# Patient Record
Sex: MALE | Race: WHITE | Employment: OTHER | URBAN - METROPOLITAN AREA
[De-identification: names, ages, dates, MRNs, and addresses within clinical notes are randomized per-mention and may not be internally consistent; named-entity substitution may affect disease eponyms.]

---

## 2021-04-25 ENCOUNTER — APPOINTMENT (OUTPATIENT)
Dept: GENERAL RADIOLOGY | Age: 76
End: 2021-04-25
Attending: EMERGENCY MEDICINE
Payer: OTHER GOVERNMENT

## 2021-04-25 ENCOUNTER — APPOINTMENT (OUTPATIENT)
Dept: CT IMAGING | Age: 76
End: 2021-04-25
Attending: EMERGENCY MEDICINE
Payer: OTHER GOVERNMENT

## 2021-04-25 ENCOUNTER — HOSPITAL ENCOUNTER (EMERGENCY)
Age: 76
Discharge: HOME | End: 2021-04-25
Attending: EMERGENCY MEDICINE
Payer: OTHER GOVERNMENT

## 2021-04-25 VITALS
RESPIRATION RATE: 18 BRPM | HEIGHT: 75 IN | SYSTOLIC BLOOD PRESSURE: 155 MMHG | BODY MASS INDEX: 23 KG/M2 | HEART RATE: 76 BPM | DIASTOLIC BLOOD PRESSURE: 72 MMHG | WEIGHT: 185 LBS | OXYGEN SATURATION: 98 % | TEMPERATURE: 98.1 F

## 2021-04-25 DIAGNOSIS — F03.91 DEMENTIA WITH BEHAVIORAL DISTURBANCE, UNSPECIFIED DEMENTIA TYPE: Primary | ICD-10-CM

## 2021-04-25 LAB
ALBUMIN SERPL-MCNC: 3.7 G/DL (ref 3.2–4.6)
ALBUMIN/GLOB SERPL: 1.1 {RATIO} (ref 1.2–3.5)
ALP SERPL-CCNC: 107 U/L (ref 50–136)
ALT SERPL-CCNC: 26 U/L (ref 12–65)
ANION GAP SERPL CALC-SCNC: 5 MMOL/L (ref 7–16)
AST SERPL-CCNC: 36 U/L (ref 15–37)
ATRIAL RATE: 84 BPM
BASOPHILS # BLD: 0 K/UL (ref 0–0.2)
BASOPHILS NFR BLD: 0 % (ref 0–2)
BILIRUB SERPL-MCNC: 1.1 MG/DL (ref 0.2–1.1)
BUN SERPL-MCNC: 28 MG/DL (ref 8–23)
CALCIUM SERPL-MCNC: 9.1 MG/DL (ref 8.3–10.4)
CALCULATED P AXIS, ECG09: 94 DEGREES
CALCULATED R AXIS, ECG10: 176 DEGREES
CALCULATED T AXIS, ECG11: 125 DEGREES
CHLORIDE SERPL-SCNC: 106 MMOL/L (ref 98–107)
CK SERPL-CCNC: 195 U/L (ref 21–215)
CO2 SERPL-SCNC: 31 MMOL/L (ref 21–32)
CREAT SERPL-MCNC: 0.93 MG/DL (ref 0.8–1.5)
DIAGNOSIS, 93000: NORMAL
DIFFERENTIAL METHOD BLD: ABNORMAL
EOSINOPHIL # BLD: 0.1 K/UL (ref 0–0.8)
EOSINOPHIL NFR BLD: 3 % (ref 0.5–7.8)
ERYTHROCYTE [DISTWIDTH] IN BLOOD BY AUTOMATED COUNT: 13.2 % (ref 11.9–14.6)
GLOBULIN SER CALC-MCNC: 3.3 G/DL (ref 2.3–3.5)
GLUCOSE SERPL-MCNC: 88 MG/DL (ref 65–100)
HCT VFR BLD AUTO: 38.2 % (ref 41.1–50.3)
HGB BLD-MCNC: 13.4 G/DL (ref 13.6–17.2)
IMM GRANULOCYTES # BLD AUTO: 0 K/UL (ref 0–0.5)
IMM GRANULOCYTES NFR BLD AUTO: 0 % (ref 0–5)
INR PPP: 2.2
LYMPHOCYTES # BLD: 1.5 K/UL (ref 0.5–4.6)
LYMPHOCYTES NFR BLD: 31 % (ref 13–44)
MAGNESIUM SERPL-MCNC: 2 MG/DL (ref 1.8–2.4)
MCH RBC QN AUTO: 34 PG (ref 26.1–32.9)
MCHC RBC AUTO-ENTMCNC: 35.1 G/DL (ref 31.4–35)
MCV RBC AUTO: 97 FL (ref 79.6–97.8)
MONOCYTES # BLD: 0.4 K/UL (ref 0.1–1.3)
MONOCYTES NFR BLD: 8 % (ref 4–12)
NEUTS SEG # BLD: 2.9 K/UL (ref 1.7–8.2)
NEUTS SEG NFR BLD: 59 % (ref 43–78)
NRBC # BLD: 0 K/UL (ref 0–0.2)
P-R INTERVAL, ECG05: 268 MS
PLATELET # BLD AUTO: 155 K/UL (ref 150–450)
PMV BLD AUTO: 10.5 FL (ref 9.4–12.3)
POTASSIUM SERPL-SCNC: 4 MMOL/L (ref 3.5–5.1)
PROT SERPL-MCNC: 7 G/DL (ref 6.3–8.2)
PROTHROMBIN TIME: 24.7 SEC (ref 12.5–14.7)
Q-T INTERVAL, ECG07: 384 MS
QRS DURATION, ECG06: 94 MS
QTC CALCULATION (BEZET), ECG08: 453 MS
RBC # BLD AUTO: 3.94 M/UL (ref 4.23–5.6)
SODIUM SERPL-SCNC: 142 MMOL/L (ref 138–145)
TROPONIN-HIGH SENSITIVITY: 20.6 PG/ML (ref 0–14)
TROPONIN-HIGH SENSITIVITY: 23.3 PG/ML (ref 0–14)
VENTRICULAR RATE, ECG03: 84 BPM
WBC # BLD AUTO: 5 K/UL (ref 4.3–11.1)

## 2021-04-25 PROCEDURE — 71045 X-RAY EXAM CHEST 1 VIEW: CPT

## 2021-04-25 PROCEDURE — 85025 COMPLETE CBC W/AUTO DIFF WBC: CPT

## 2021-04-25 PROCEDURE — 96374 THER/PROPH/DIAG INJ IV PUSH: CPT

## 2021-04-25 PROCEDURE — 83735 ASSAY OF MAGNESIUM: CPT

## 2021-04-25 PROCEDURE — 81003 URINALYSIS AUTO W/O SCOPE: CPT

## 2021-04-25 PROCEDURE — 74011250636 HC RX REV CODE- 250/636: Performed by: EMERGENCY MEDICINE

## 2021-04-25 PROCEDURE — 80053 COMPREHEN METABOLIC PANEL: CPT

## 2021-04-25 PROCEDURE — 70450 CT HEAD/BRAIN W/O DYE: CPT

## 2021-04-25 PROCEDURE — 96375 TX/PRO/DX INJ NEW DRUG ADDON: CPT

## 2021-04-25 PROCEDURE — 85610 PROTHROMBIN TIME: CPT

## 2021-04-25 PROCEDURE — 99285 EMERGENCY DEPT VISIT HI MDM: CPT

## 2021-04-25 PROCEDURE — 82550 ASSAY OF CK (CPK): CPT

## 2021-04-25 PROCEDURE — 93005 ELECTROCARDIOGRAM TRACING: CPT | Performed by: EMERGENCY MEDICINE

## 2021-04-25 PROCEDURE — 84484 ASSAY OF TROPONIN QUANT: CPT

## 2021-04-25 RX ORDER — DIPHENHYDRAMINE HYDROCHLORIDE 50 MG/ML
25 INJECTION, SOLUTION INTRAMUSCULAR; INTRAVENOUS
Status: DISCONTINUED | OUTPATIENT
Start: 2021-04-25 | End: 2021-04-25 | Stop reason: HOSPADM

## 2021-04-25 RX ORDER — HALOPERIDOL 5 MG/ML
5 INJECTION INTRAMUSCULAR ONCE
Status: DISCONTINUED | OUTPATIENT
Start: 2021-04-25 | End: 2021-04-25 | Stop reason: HOSPADM

## 2021-04-25 RX ORDER — LORAZEPAM 2 MG/ML
1 INJECTION INTRAMUSCULAR
Status: COMPLETED | OUTPATIENT
Start: 2021-04-25 | End: 2021-04-25

## 2021-04-25 RX ORDER — DIPHENHYDRAMINE HYDROCHLORIDE 50 MG/ML
25 INJECTION, SOLUTION INTRAMUSCULAR; INTRAVENOUS
Status: COMPLETED | OUTPATIENT
Start: 2021-04-25 | End: 2021-04-25

## 2021-04-25 RX ADMIN — DIPHENHYDRAMINE HYDROCHLORIDE 25 MG: 50 INJECTION, SOLUTION INTRAMUSCULAR; INTRAVENOUS at 11:49

## 2021-04-25 RX ADMIN — LORAZEPAM 1 MG: 2 INJECTION INTRAMUSCULAR; INTRAVENOUS at 11:49

## 2021-04-25 NOTE — ED NOTES
Pt sent back from radiology due to confusion and combative. This RN unable to obtain EKG. Pt is confused and punching. EKG obtained with multiple RNs and physician. Ativan and benadryl administered after EKG. Lalo from [facility] at bedside.

## 2021-04-25 NOTE — DISCHARGE INSTRUCTIONS
Continue home medications. Schedule close follow-up primary care physician. Return to ED if symptoms worsen or progress in any way.

## 2021-04-25 NOTE — ED TRIAGE NOTES
Pt arrives via EMS from [facility]. Staff reports fall from bed at 0400 this AM and not awake at usual time 0800. Reports nosebleed that stopped with pressure. Pt with history of dementia.

## 2021-04-25 NOTE — ED PROVIDER NOTES
72-year-old male with history of dementia, anticoagulated on Coumadin, hypertension who presents via EMS from [facility] with reported fall around 4 AM.  Patient was reportedly not awake at his normal 8 AM time this morning. Patient is noted to have dementia but staff concerned for possible increased confusion. Patient demented at baseline and unable to provide any historical information. No reports of any recent medication changes. EMS report that staff state that patient with right sided epistaxis that has resolved. The history is provided by the patient and the EMS personnel. The history is limited by the condition of the patient. No  was used. Fall  The accident occurred 6 to 12 hours ago. Pertinent negatives include no fever, no abdominal pain, no nausea, no vomiting, no hematuria and no laceration. The risk factors include dementia. He has tried nothing for the symptoms. The treatment provided no relief. No past medical history on file. No past surgical history on file. No family history on file.     Social History     Socioeconomic History    Marital status: Not on file     Spouse name: Not on file    Number of children: Not on file    Years of education: Not on file    Highest education level: Not on file   Occupational History    Not on file   Social Needs    Financial resource strain: Not on file    Food insecurity     Worry: Not on file     Inability: Not on file    Transportation needs     Medical: Not on file     Non-medical: Not on file   Tobacco Use    Smoking status: Not on file   Substance and Sexual Activity    Alcohol use: Not on file    Drug use: Not on file    Sexual activity: Not on file   Lifestyle    Physical activity     Days per week: Not on file     Minutes per session: Not on file    Stress: Not on file   Relationships    Social connections     Talks on phone: Not on file     Gets together: Not on file     Attends Rastafari service: Not on file     Active member of club or organization: Not on file     Attends meetings of clubs or organizations: Not on file     Relationship status: Not on file    Intimate partner violence     Fear of current or ex partner: Not on file     Emotionally abused: Not on file     Physically abused: Not on file     Forced sexual activity: Not on file   Other Topics Concern    Not on file   Social History Narrative    Not on file         ALLERGIES: Patient has no known allergies. Review of Systems   Unable to perform ROS: Dementia   Constitutional: Negative for chills and fever. Respiratory: Negative for cough and shortness of breath. Cardiovascular: Negative for chest pain. Gastrointestinal: Negative for abdominal pain, nausea and vomiting. Genitourinary: Negative for flank pain and hematuria. Musculoskeletal: Negative for arthralgias, back pain, neck pain and neck stiffness. Skin: Negative for rash and wound. Neurological: Negative for seizures. Psychiatric/Behavioral: Positive for confusion. Vitals:    04/25/21 0951   BP: (!) 142/73   Pulse: (!) 57   Resp: 18   Temp: 97.7 °F (36.5 °C)   SpO2: 98%   Weight: 83.9 kg (185 lb)   Height: 6' 3\" (1.905 m)            Physical Exam  Vitals signs and nursing note reviewed. Constitutional:       Comments: Demented. HENT:      Head: Normocephalic and atraumatic. Comments: No findings suggestive of basilar skull fracture. Nose: Nose normal.      Comments: Midface stable. Dried blood noted to R nares. No active epistaxis noted. No septal hematoma. Mouth/Throat:      Mouth: Mucous membranes are moist.   Eyes:      Extraocular Movements: Extraocular movements intact. Pupils: Pupils are equal, round, and reactive to light. Neck:      Musculoskeletal: Normal range of motion. Comments: No midline C-spine tenderness to palpation. No step-off. Cardiovascular:      Rate and Rhythm: Normal rate. Pulses: Normal pulses.       Heart sounds: Normal heart sounds. Pulmonary:      Effort: Pulmonary effort is normal.      Breath sounds: Normal breath sounds. Abdominal:      Palpations: Abdomen is soft. Tenderness: There is no abdominal tenderness. There is no guarding or rebound. Comments: Soft, nontender, nondistended. No rebound or guarding. Musculoskeletal: Normal range of motion. Comments: No midline T-spine or L-spine tenderness to palpation. No deformities noted to extremities. Skin:     Findings: No erythema, laceration or rash. Neurological:      General: No focal deficit present. Mental Status: He is alert. Comments: Moving all extremities equally. No facial droop. MDM  Number of Diagnoses or Management Options  Dementia with behavioral disturbance, unspecified dementia type [facility]): new and requires workup  Diagnosis management comments: Slightly hypertensive on arrival.  Remainder vital signs stable. EKG with normal sinus rhythm. No ischemic changes noted. CBC, CMP, mag, CK unremarkable. INR 2.2. Urine dipstick negative for UTI. CT head negative for any acute or concerning finding. Chest x-ray negative for infiltrate or consolidation. Patient with increased agitation throughout ED stay requiring Ativan, Benadryl, Haldol. Staff from [facility] states that patient with known dementia and behavioral issues. Initial troponin 23.3. Repeat 2-hour troponin pending. If unremarkable or no significant trend upward will discharge back to facility.        Amount and/or Complexity of Data Reviewed  Clinical lab tests: ordered and reviewed  Tests in the radiology section of CPT®: ordered and reviewed  Tests in the medicine section of CPT®: ordered and reviewed  Review and summarize past medical records: yes  Independent visualization of images, tracings, or specimens: yes    Risk of Complications, Morbidity, and/or Mortality  Presenting problems: moderate  Diagnostic procedures: moderate  Management options: moderate    Patient Progress  Patient progress: stable    ED Course as of Apr 25 1441   Sun Apr 25, 2021   1312 Patient agitated and aggressive in CT. Haldol 5 mg IV ordered. [DF]   0847 CXR IMPRESSIONs: No acute findings. [DF]   1313 CT head IMPRESSION:   Chronic appearing changes as described without acute intracranial abnormality. [DF]      ED Course User Index  [DF] Adrienne Figueroa MD       EKG    Date/Time: 4/25/2021 2:41 PM  Performed by: Adrienne Figueroa MD  Authorized by: Adrienne Figueroa MD     ECG reviewed by ED Physician in the absence of a cardiologist: yes    Rate:     ECG rate:  84    ECG rate assessment: normal    Rhythm:     Rhythm: sinus rhythm    Ectopy:     Ectopy: none    QRS:     QRS axis:  Normal    QRS intervals:  Normal  Conduction:     Conduction: normal    ST segments:     ST segments:  Normal  T waves:     T waves: normal              Results Include:    Recent Results (from the past 24 hour(s))   CBC WITH AUTOMATED DIFF    Collection Time: 04/25/21  9:55 AM   Result Value Ref Range    WBC 5.0 4.3 - 11.1 K/uL    RBC 3.94 (L) 4.23 - 5.6 M/uL    HGB 13.4 (L) 13.6 - 17.2 g/dL    HCT 38.2 (L) 41.1 - 50.3 %    MCV 97.0 79.6 - 97.8 FL    MCH 34.0 (H) 26.1 - 32.9 PG    MCHC 35.1 (H) 31.4 - 35.0 g/dL    RDW 13.2 11.9 - 14.6 %    PLATELET 149 281 - 021 K/uL    MPV 10.5 9.4 - 12.3 FL    ABSOLUTE NRBC 0.00 0.0 - 0.2 K/uL    DF AUTOMATED      NEUTROPHILS 59 43 - 78 %    LYMPHOCYTES 31 13 - 44 %    MONOCYTES 8 4.0 - 12.0 %    EOSINOPHILS 3 0.5 - 7.8 %    BASOPHILS 0 0.0 - 2.0 %    IMMATURE GRANULOCYTES 0 0.0 - 5.0 %    ABS. NEUTROPHILS 2.9 1.7 - 8.2 K/UL    ABS. LYMPHOCYTES 1.5 0.5 - 4.6 K/UL    ABS. MONOCYTES 0.4 0.1 - 1.3 K/UL    ABS. EOSINOPHILS 0.1 0.0 - 0.8 K/UL    ABS. BASOPHILS 0.0 0.0 - 0.2 K/UL    ABS. IMM.  GRANS. 0.0 0.0 - 0.5 K/UL   METABOLIC PANEL, COMPREHENSIVE    Collection Time: 04/25/21  9:55 AM   Result Value Ref Range    Sodium 142 138 - 145 mmol/L    Potassium 4.0 3.5 - 5.1 mmol/L    Chloride 106 98 - 107 mmol/L    CO2 31 21 - 32 mmol/L    Anion gap 5 (L) 7 - 16 mmol/L    Glucose 88 65 - 100 mg/dL    BUN 28 (H) 8 - 23 MG/DL    Creatinine 0.93 0.8 - 1.5 MG/DL    GFR est AA >60 >60 ml/min/1.73m2    GFR est non-AA >60 >60 ml/min/1.73m2    Calcium 9.1 8.3 - 10.4 MG/DL    Bilirubin, total 1.1 0.2 - 1.1 MG/DL    ALT (SGPT) 26 12 - 65 U/L    AST (SGOT) 36 15 - 37 U/L    Alk. phosphatase 107 50 - 136 U/L    Protein, total 7.0 6.3 - 8.2 g/dL    Albumin 3.7 3.2 - 4.6 g/dL    Globulin 3.3 2.3 - 3.5 g/dL    A-G Ratio 1.1 (L) 1.2 - 3.5     PROTHROMBIN TIME + INR    Collection Time: 04/25/21  9:55 AM   Result Value Ref Range    Prothrombin time 24.7 (H) 12.5 - 14.7 sec    INR 2.2     CK    Collection Time: 04/25/21  9:55 AM   Result Value Ref Range     21 - 215 U/L   MAGNESIUM    Collection Time: 04/25/21  9:55 AM   Result Value Ref Range    Magnesium 2.0 1.8 - 2.4 mg/dL   TROPONIN-HIGH SENSITIVITY    Collection Time: 04/25/21  9:55 AM   Result Value Ref Range    Troponin-High Sensitivity 23.3 (H) 0 - 14 pg/mL   EKG, 12 LEAD, INITIAL    Collection Time: 04/25/21 11:42 AM   Result Value Ref Range    Ventricular Rate 84 BPM    Atrial Rate 84 BPM    P-R Interval 268 ms    QRS Duration 94 ms    Q-T Interval 384 ms    QTC Calculation (Bezet) 453 ms    Calculated P Axis 94 degrees    Calculated R Axis 176 degrees    Calculated T Axis 125 degrees    Diagnosis       !!! Poor data quality, interpretation may be adversely affected  !!! Suspect arm lead reversal, interpretation assumes no reversal  !! AGE AND GENDER SPECIFIC ECG ANALYSIS !! Sinus rhythm with 1st degree A-V block  Lateral infarct , age undetermined  Abnormal ECG  No previous ECGs available                Quinton Wilson MD; 4/25/2021 @10:00 AM Voice dictation software was used during the making of this note.   This software is not perfect and grammatical and other typographical errors may be present.   This note has not been proofread for errors.  ===================================================================

## 2021-04-25 NOTE — ED NOTES
I have reviewed discharge instructions with the [facility]. The Facility [facility] verbalized understanding. Awaiting Paula Cait transport    Patient given 0 scripts. To continue your aftercare when you leave the hospital, you may receive an automated call from our care team to check in on how you are doing. This is a free service and part of our promise to provide the best care and service to meet your aftercare needs.  If you have questions, or wish to unsubscribe from this service please call [phone]. Thank you for Choosing our [facility] Emergency Department.

## 2021-04-25 NOTE — ED NOTES
TRANSFER - OUT REPORT: 
 
Verbal report given to Becky Mart) on Jennifer Roa  being transferred to [facility](unit) for routine progression of care Report consisted of patients Situation, Background, Assessment and  
Recommendations(SBAR). Information from the following report(s) ED Summary was reviewed with the receiving nurse. Lines:    
 
Opportunity for questions and clarification was provided. Patient transported with: 
 [facility] transport

## 2021-08-13 ENCOUNTER — HOSPICE ADMISSION (OUTPATIENT)
Dept: HOSPICE | Facility: HOSPICE | Age: 76
End: 2021-08-13